# Patient Record
(demographics unavailable — no encounter records)

---

## 2024-11-11 NOTE — HISTORY OF PRESENT ILLNESS
[No falls in past year] : Patient reported no falls in the past year [Completely Independent] : Completely independent. [NO] : No [0] : 2) Feeling down, depressed, or hopeless: Not at all (0) [PHQ-2 Negative - No further assessment needed] : PHQ-2 Negative - No further assessment needed [FreeTextEntry1] : Mrs. Ba is a 74 yo F coming from home lives alone. Accompanied to current visit by her daughter Alycia [phone]. History obtained from pt and assisted by her daughter.  Ambulates without assistance. Has Hx of Diabetes, HLD (hyperlipidemia), low vitamin B12.  Comes to the office as an initial visit due to memory changes for the past year. She was seen by a neurologist earlier this year, she had brain imaging showing no acute changes. She was treated for a B12 deficiency with IM injections but now is taking B12 tablets. She was told her memory changes could be related to aging.  Her daughter has been more involved in her care, doctor's appointment and paying bills. She continues to drive. She has left the stove on a couple of times.  She hasn't had any falls.   Patient continues to drive short distances.   Mammography 2024, normal. DEXA scan 2024.   Past Surgical History: S/P breast biopsy, right S/P  section x 2.  Social History: - Marital Status  - Lives With alone - Her son lives in the upper floor.   GYN Dr. Givens  [[id number]] : 0

## 2024-11-11 NOTE — HISTORY OF PRESENT ILLNESS
[No falls in past year] : Patient reported no falls in the past year [Completely Independent] : Completely independent. [NO] : No [0] : 2) Feeling down, depressed, or hopeless: Not at all (0) [PHQ-2 Negative - No further assessment needed] : PHQ-2 Negative - No further assessment needed [FreeTextEntry1] : Mrs. Ba is a 76 yo F coming from home lives alone. Accompanied to current visit by her daughter Alycia [phone]. History obtained from pt and assisted by her daughter.  Ambulates without assistance. Has Hx of Diabetes, HLD (hyperlipidemia), low vitamin B12.  Comes to the office as an initial visit due to memory changes for the past year. She was seen by a neurologist earlier this year, she had brain imaging showing no acute changes. She was treated for a B12 deficiency with IM injections but now is taking B12 tablets. She was told her memory changes could be related to aging.  Her daughter has been more involved in her care, doctor's appointment and paying bills. She continues to drive. She has left the stove on a couple of times.  She hasn't had any falls.   Patient continues to drive short distances.   Mammography 2024, normal. DEXA scan 2024.   Past Surgical History: S/P breast biopsy, right S/P  section x 2.  Social History: - Marital Status  - Lives With alone - Her son lives in the upper floor.   GYN Dr. Givens  [[id number]] : 0

## 2024-11-11 NOTE — ASSESSMENT
[FreeTextEntry1] : - influenza vaccine given and recommended to get COVID booster - ordered routine blood work - pt takes MV and supplements mostly but unsure of doses, requested to bring all OTC medications she takes on regular basis for reconciliation - f/u in 1 month for cognitive evaluation

## 2025-04-02 NOTE — PHYSICAL EXAM
[] : Resident [Alert] : alert [Normal Outer Ear/Nose] : the ears and nose were normal in appearance [Normal Appearance] : the appearance of the neck was normal [Supple] : the neck was supple [No Respiratory Distress] : no respiratory distress [No Acc Muscle Use] : no accessory muscle use [Auscultation Breath Sounds / Voice Sounds] : lungs were clear to auscultation bilaterally [Respiration, Rhythm And Depth] : normal respiratory rhythm and effort [Heart Rate And Rhythm] : heart rate was normal and rhythm regular [Bowel Sounds] : normal bowel sounds [Abdomen Tenderness] : non-tender [Abdomen Soft] : soft [No Spinal Tenderness] : no spinal tenderness [Normal Color / Pigmentation] : normal skin color and pigmentation [Normal Turgor] : normal skin turgor [No Focal Deficits] : no focal deficits [Normal Affect] : the affect was normal [Normal Mood] : the mood was normal [___ / 5] : Visuospatial / Executive: [unfilled] / 5 [0 / 0] : Memory: 0 / 0 [___ / 3] : Attention (Serial 7 subtraction): [unfilled] / 3 [___ / 1] : Fluency: [unfilled] / 1 [___ / 2] : Abstraction: [unfilled] / 2 [___ / 5] : Delayed Recall: [unfilled] / 5 [___ / 6] : Orientation: [unfilled] / 6 [MocaTotal] : 8 [FreeTextEntry1] : 12/6/24

## 2025-04-02 NOTE — ASSESSMENT
[FreeTextEntry1] : - recommended to get COVID booster and PNA vaccine given - HCP completed - discussed recommendation to assist with medications and with meals

## 2025-04-02 NOTE — PHYSICAL EXAM
[Alert] : alert [Normal Outer Ear/Nose] : the ears and nose were normal in appearance [Normal Appearance] : the appearance of the neck was normal [Supple] : the neck was supple [No Respiratory Distress] : no respiratory distress [No Acc Muscle Use] : no accessory muscle use [Auscultation Breath Sounds / Voice Sounds] : lungs were clear to auscultation bilaterally [Respiration, Rhythm And Depth] : normal respiratory rhythm and effort [Heart Rate And Rhythm] : heart rate was normal and rhythm regular [Bowel Sounds] : normal bowel sounds [Abdomen Tenderness] : non-tender [Abdomen Soft] : soft [No Spinal Tenderness] : no spinal tenderness [Normal Color / Pigmentation] : normal skin color and pigmentation [Normal Turgor] : normal skin turgor [No Focal Deficits] : no focal deficits [Normal Affect] : the affect was normal [Normal Mood] : the mood was normal [___ / 5] : Visuospatial / Executive: [unfilled] / 5 [0 / 0] : Memory: 0 / 0 [___ / 3] : Attention (Serial 7 subtraction): [unfilled] / 3 [___ / 1] : Fluency: [unfilled] / 1 [___ / 2] : Abstraction: [unfilled] / 2 [___ / 5] : Delayed Recall: [unfilled] / 5 [___ / 6] : Orientation: [unfilled] / 6 [MocaTotal] : 8 [FreeTextEntry1] : 12/6/24

## 2025-04-02 NOTE — HISTORY OF PRESENT ILLNESS
[No falls in past year] : Patient reported no falls in the past year [Completely Independent] : Completely independent. [NO] : No [0] : 2) Feeling down, depressed, or hopeless: Not at all (0) [PHQ-2 Negative - No further assessment needed] : PHQ-2 Negative - No further assessment needed [Designated Healthcare Proxy] : Designated healthcare proxy [Name: ___] : Health Care Proxy's Name: [unfilled]  [Relationship: ___] : Relationship: [unfilled] [I will adhere to the patient's wishes.] : I will adhere to the patient's wishes. [Time Spent: ___ minutes] : Time Spent: [unfilled] minutes [FreeTextEntry1] : Mrs. Ba is a 74 yo F coming from home lives alone. Accompanied to current visit by her daughter Alycia [phone]. History obtained from pt and assisted by her daughter.  Ambulates without assistance. Has Hx of Diabetes, HLD (hyperlipidemia), low vitamin B12.  Comes to the office for a follow up visit due to memory changes for the past year. She was seen by a neurologist earlier this year, she had brain imaging showing no acute changes. She was treated for a B12 deficiency with IM injections but now is taking B12 tablets. She was told her memory changes could be related to aging.  Her daughter notices pt has worsening memory problems a requires assistance in cooking, eating meals and being reminded to take medications. She has a pill box but never takes medications in her pill box as she believes she already took them. Her granddaughter lives with her but as per Alycia pt is resistant towards family reminding to take medications or assisting in her meals.   Her daughter has been more involved in her care, doctor's appointment and paying bills. She continues to drive. She has left the stove on a couple of times.  She hasn't had any falls.   Patient continues to drive short distances.   Mammography 2024, normal. DEXA scan 2024.   Past Surgical History: S/P breast biopsy, right S/P  section x 2.  Social History: - Marital Status  - Lives With granddaughter - Her son lives in the upper floor.   GYN Dr. Givens  [[id number]] : 0 [AdvancecareDate] : 12/6/24 [FreeTextEntry4] : Had a discussion with patient. Patient states that she relies on her daughter Alycia [phone] for assistance with medical decisions. Patient wishes to make this person their health care proxy and her son Alcides [phone] as her alternate decisions maker. HCP form completed and scanned into chart based on patient's wishes and discussion held.

## 2025-04-02 NOTE — HISTORY OF PRESENT ILLNESS
[No falls in past year] : Patient reported no falls in the past year [Completely Independent] : Completely independent. [NO] : No [0] : 2) Feeling down, depressed, or hopeless: Not at all (0) [PHQ-2 Negative - No further assessment needed] : PHQ-2 Negative - No further assessment needed [Designated Healthcare Proxy] : Designated healthcare proxy [Name: ___] : Health Care Proxy's Name: [unfilled]  [Relationship: ___] : Relationship: [unfilled] [I will adhere to the patient's wishes.] : I will adhere to the patient's wishes. [Time Spent: ___ minutes] : Time Spent: [unfilled] minutes [FreeTextEntry1] : Mrs. Ba is a 74 yo F coming from home lives alone. Accompanied to current visit by her daughter Alycia [phone]. History obtained from pt and assisted by her daughter.  Ambulates without assistance. Has Hx of Diabetes, HLD (hyperlipidemia), low vitamin B12.  Comes to the office for a follow up visit due to memory changes for the past year. She was seen by a neurologist earlier this year, she had brain imaging showing no acute changes. She was treated for a B12 deficiency with IM injections but now is taking B12 tablets. She was told her memory changes could be related to aging.  Her daughter notices pt has worsening memory problems a requires assistance in cooking, eating meals and being reminded to take medications. She has a pill box but never takes medications in her pill box as she believes she already took them. Her granddaughter lives with her but as per Alycia pt is resistant towards family reminding to take medications or assisting in her meals.   Her daughter has been more involved in her care, doctor's appointment and paying bills. She continues to drive. She has left the stove on a couple of times.  She hasn't had any falls.   Patient continues to drive short distances.   Mammography 2024, normal. DEXA scan 2024.   Past Surgical History: S/P breast biopsy, right S/P  section x 2.  Social History: - Marital Status  - Lives With granddaughter - Her son lives in the upper floor.   GYN Dr. Givens  [[id number]] : 0 [AdvancecareDate] : 12/6/24 [FreeTextEntry4] : Had a discussion with patient. Patient states that she relies on her daughter Alycia [phone] for assistance with medical decisions. Patient wishes to make this person their health care proxy and her son Alcides [phone] as her alternate decisions maker. HCP form completed and scanned into chart based on patient's wishes and discussion held.

## 2025-06-30 NOTE — ASSESSMENT
[FreeTextEntry1] : - ordered repeat blood work  - discussed recommendation to assist with medications and with meals - f/u in 3 months or earlier if needed

## 2025-06-30 NOTE — PHYSICAL EXAM
[Alert] : alert [Normal Outer Ear/Nose] : the ears and nose were normal in appearance [Normal Appearance] : the appearance of the neck was normal [Supple] : the neck was supple [No Respiratory Distress] : no respiratory distress [No Acc Muscle Use] : no accessory muscle use [Respiration, Rhythm And Depth] : normal respiratory rhythm and effort [Auscultation Breath Sounds / Voice Sounds] : lungs were clear to auscultation bilaterally [Heart Rate And Rhythm] : heart rate was normal and rhythm regular [Bowel Sounds] : normal bowel sounds [Abdomen Tenderness] : non-tender [Abdomen Soft] : soft [No Spinal Tenderness] : no spinal tenderness [Normal Color / Pigmentation] : normal skin color and pigmentation [Normal Turgor] : normal skin turgor [No Focal Deficits] : no focal deficits [Normal Affect] : the affect was normal [Normal Mood] : the mood was normal [___ / 5] : Visuospatial / Executive: [unfilled] / 5 [0 / 0] : Memory: 0 / 0 [___ / 3] : Attention (Serial 7 subtraction): [unfilled] / 3 [___ / 1] : Fluency: [unfilled] / 1 [___ / 2] : Abstraction: [unfilled] / 2 [___ / 5] : Delayed Recall: [unfilled] / 5 [___ / 6] : Orientation: [unfilled] / 6 [MocaTotal] : 8 [FreeTextEntry1] : 12/6/24

## 2025-06-30 NOTE — HISTORY OF PRESENT ILLNESS
[No falls in past year] : Patient reported no falls in the past year [Completely Independent] : Completely independent. [NO] : No [0] : 2) Feeling down, depressed, or hopeless: Not at all (0) [PHQ-2 Negative - No further assessment needed] : PHQ-2 Negative - No further assessment needed [Designated Healthcare Proxy] : Designated healthcare proxy [Name: ___] : Health Care Proxy's Name: [unfilled]  [Relationship: ___] : Relationship: [unfilled] [I will adhere to the patient's wishes.] : I will adhere to the patient's wishes. [Time Spent: ___ minutes] : Time Spent: [unfilled] minutes [FreeTextEntry1] : Mrs. Ba is a 77 yo F coming from home lives alone. Accompanied to current visit by her daughter Alycia [phone]. History obtained from pt and assisted by her daughter.  Ambulates without assistance. Has Hx of Diabetes, HLD (hyperlipidemia), low vitamin B12.  Comes to the office for a follow up visit due to memory changes for the past year. She was seen by a neurologist earlier this year, she had brain imaging showing no acute changes. She was treated for a B12 deficiency with IM injections but now is taking B12 tablets. She was told her memory changes could be related to aging.  Since last visit pts daughter finds it challenging to hire a HHA /  due to pt refusing to allowing someone help her with her care. She lost 8 lbs since the last visit in December. Pt reports "she is just not hungry. Discussed with daughter concern of possible self neglect.   Her daughter notices pt has worsening memory problems a requires assistance in cooking, eating meals and being reminded to take medications. She has a pill box but never takes medications in her pill box as she believes she already took them. Her granddaughter lives with her but as per Alycia pt is resistant towards family reminding to take medications or assisting in her meals.   Her daughter has been more involved in her care, doctor's appointment and paying bills. She continues to drive. She has left the stove on a couple of times.  She hasn't had any falls.   Patient continues to drive short distances.   Mammography 2024, normal. DEXA scan 2024.   Past Surgical History: S/P breast biopsy, right S/P  section x 2.  Social History: - Marital Status  - Lives With granddaughter - Her son lives in the upper floor.   GYN Dr. Givens  [[id number]] : 0 [AdvancecareDate] : 12/6/24 [FreeTextEntry4] : Had a discussion with patient. Patient states that she relies on her daughter Alycia [phone] for assistance with medical decisions. Patient wishes to make this person their health care proxy and her son Alcides [phone] as her alternate decisions maker. HCP form completed and scanned into chart based on patient's wishes and discussion held.

## 2025-06-30 NOTE — HISTORY OF PRESENT ILLNESS
[No falls in past year] : Patient reported no falls in the past year [Completely Independent] : Completely independent. [NO] : No [0] : 2) Feeling down, depressed, or hopeless: Not at all (0) [PHQ-2 Negative - No further assessment needed] : PHQ-2 Negative - No further assessment needed [Designated Healthcare Proxy] : Designated healthcare proxy [Name: ___] : Health Care Proxy's Name: [unfilled]  [Relationship: ___] : Relationship: [unfilled] [I will adhere to the patient's wishes.] : I will adhere to the patient's wishes. [Time Spent: ___ minutes] : Time Spent: [unfilled] minutes [FreeTextEntry1] : Mrs. Ba is a 77 yo F coming from home lives alone. Accompanied to current visit by her daughter Alycia [phone]. History obtained from pt and assisted by her daughter.  Ambulates without assistance. Has Hx of Diabetes, HLD (hyperlipidemia), low vitamin B12.  Comes to the office for a follow up visit due to memory changes for the past year. She was seen by a neurologist earlier this year, she had brain imaging showing no acute changes. She was treated for a B12 deficiency with IM injections but now is taking B12 tablets. She was told her memory changes could be related to aging.  Since last visit pts daughter finds it challenging to hire a HHA /  due to pt refusing to allowing someone help her with her care. She lost 8 lbs since the last visit in December. Pt reports "she is just not hungry. Discussed with daughter concern of possible self neglect.   Her daughter notices pt has worsening memory problems a requires assistance in cooking, eating meals and being reminded to take medications. She has a pill box but never takes medications in her pill box as she believes she already took them. Her granddaughter lives with her but as per Alycia pt is resistant towards family reminding to take medications or assisting in her meals.   Her daughter has been more involved in her care, doctor's appointment and paying bills. She continues to drive. She has left the stove on a couple of times.  She hasn't had any falls.   Patient continues to drive short distances.   Mammography 2024, normal. DEXA scan 2024.   Past Surgical History: S/P breast biopsy, right S/P  section x 2.  Social History: - Marital Status  - Lives With granddaughter - Her son lives in the upper floor.   GYN Dr. Givens  [[id number]] : 0 [AdvancecareDate] : 12/6/24 [FreeTextEntry4] : Had a discussion with patient. Patient states that she relies on her daughter Alycia [phone] for assistance with medical decisions. Patient wishes to make this person their health care proxy and her son Alcides [phone] as her alternate decisions maker. HCP form completed and scanned into chart based on patient's wishes and discussion held.

## 2025-06-30 NOTE — REASON FOR VISIT
[Follow-Up] : a follow-up visit [Family Member] : family member [FreeTextEntry1] : worsening memory changes